# Patient Record
Sex: FEMALE | Race: BLACK OR AFRICAN AMERICAN | URBAN - METROPOLITAN AREA
[De-identification: names, ages, dates, MRNs, and addresses within clinical notes are randomized per-mention and may not be internally consistent; named-entity substitution may affect disease eponyms.]

---

## 2019-09-16 ENCOUNTER — APPOINTMENT (RX ONLY)
Dept: URBAN - METROPOLITAN AREA CLINIC 45 | Facility: CLINIC | Age: 69
Setting detail: DERMATOLOGY
End: 2019-09-16

## 2019-09-16 DIAGNOSIS — D22 MELANOCYTIC NEVI: ICD-10-CM

## 2019-09-16 DIAGNOSIS — D17 BENIGN LIPOMATOUS NEOPLASM: ICD-10-CM

## 2019-09-16 DIAGNOSIS — D18.0 HEMANGIOMA: ICD-10-CM

## 2019-09-16 DIAGNOSIS — L81.4 OTHER MELANIN HYPERPIGMENTATION: ICD-10-CM

## 2019-09-16 DIAGNOSIS — L82.1 OTHER SEBORRHEIC KERATOSIS: ICD-10-CM

## 2019-09-16 PROBLEM — D17.24 BENIGN LIPOMATOUS NEOPLASM OF SKIN AND SUBCUTANEOUS TISSUE OF LEFT LEG: Status: ACTIVE | Noted: 2019-09-16

## 2019-09-16 PROBLEM — D18.01 HEMANGIOMA OF SKIN AND SUBCUTANEOUS TISSUE: Status: ACTIVE | Noted: 2019-09-16

## 2019-09-16 PROBLEM — D17.0 BENIGN LIPOMATOUS NEOPLASM OF SKIN AND SUBCUTANEOUS TISSUE OF HEAD, FACE AND NECK: Status: ACTIVE | Noted: 2019-09-16

## 2019-09-16 PROBLEM — D22.5 MELANOCYTIC NEVI OF TRUNK: Status: ACTIVE | Noted: 2019-09-16

## 2019-09-16 PROCEDURE — 99213 OFFICE O/P EST LOW 20 MIN: CPT

## 2019-09-16 PROCEDURE — ? INVENTORY

## 2019-09-16 PROCEDURE — ? SUNSCREEN RECOMMENDATIONS

## 2019-09-16 PROCEDURE — ? OBSERVATION AND MEASURE

## 2019-09-16 PROCEDURE — ? COUNSELING

## 2019-09-16 PROCEDURE — ? DEFER

## 2019-09-16 ASSESSMENT — LOCATION SIMPLE DESCRIPTION DERM
LOCATION SIMPLE: RIGHT CHEEK
LOCATION SIMPLE: LEFT UPPER BACK
LOCATION SIMPLE: LEFT THIGH
LOCATION SIMPLE: ABDOMEN

## 2019-09-16 ASSESSMENT — LOCATION DETAILED DESCRIPTION DERM
LOCATION DETAILED: RIGHT LATERAL BUCCAL CHEEK
LOCATION DETAILED: LEFT ANTERIOR PROXIMAL THIGH
LOCATION DETAILED: LEFT MID-UPPER BACK
LOCATION DETAILED: RIGHT LATERAL ABDOMEN
LOCATION DETAILED: EPIGASTRIC SKIN

## 2019-09-16 ASSESSMENT — LOCATION ZONE DERM
LOCATION ZONE: LEG
LOCATION ZONE: FACE
LOCATION ZONE: TRUNK

## 2019-09-16 NOTE — HPI: EVALUATION OF SKIN LESION(S)
What Type Of Note Output Would You Prefer (Optional)?: Bullet Format
Hpi Title: Evaluation of Skin Lesions
How Severe Are Your Spot(S)?: mild
Have Your Spot(S) Been Treated In The Past?: has not been treated
Additional History: Last seen [facility] 08/2018. Patient has lipoma on right jawline- referred to plastics. Patient states a new lump is coming up on left hip/abdomen

## 2019-09-16 NOTE — PROCEDURE: MIPS QUALITY
Quality 46: Medication Reconciliation: For eligible patients only (patients seen within 30 days from discharge) Were discharged medications reconciled with the current medication list in their medication record within 30 days of discharge from the inpatient facility?
Quality 431: Preventive Care And Screening: Unhealthy Alcohol Use - Screening: Patient screened for unhealthy alcohol use using a single question and scores less than 2 times per year
Quality 110: Preventive Care And Screening: Influenza Immunization: Influenza Immunization previously received during influenza season
Quality 402: Tobacco Use And Help With Quitting Among Adolescents: Patient screened for tobacco and never smoked
Quality 130: Documentation Of Current Medications In The Medical Record: Current Medications Documented
Detail Level: Detailed
Quality 226: Preventive Care And Screening: Tobacco Use: Screening And Cessation Intervention: Patient screened for tobacco use and is an ex/non-smoker

## 2021-01-06 ENCOUNTER — WEB ENCOUNTER (OUTPATIENT)
Dept: URBAN - METROPOLITAN AREA CLINIC 54 | Facility: CLINIC | Age: 71
End: 2021-01-06

## 2021-01-06 ENCOUNTER — OFFICE VISIT (OUTPATIENT)
Dept: URBAN - METROPOLITAN AREA CLINIC 54 | Facility: CLINIC | Age: 71
End: 2021-01-06
Payer: MEDICARE

## 2021-01-06 DIAGNOSIS — K21.9 GASTROESOPHAGEAL REFLUX DISEASE WITHOUT ESOPHAGITIS: ICD-10-CM

## 2021-01-06 PROCEDURE — 99214 OFFICE O/P EST MOD 30 MIN: CPT | Performed by: INTERNAL MEDICINE

## 2021-01-06 PROCEDURE — G8420 CALC BMI NORM PARAMETERS: HCPCS | Performed by: INTERNAL MEDICINE

## 2021-01-06 PROCEDURE — G8427 DOCREV CUR MEDS BY ELIG CLIN: HCPCS | Performed by: INTERNAL MEDICINE

## 2021-01-06 PROCEDURE — 3017F COLORECTAL CA SCREEN DOC REV: CPT | Performed by: INTERNAL MEDICINE

## 2021-01-06 RX ORDER — FAMOTIDINE 20 MG/1
1 TABLET AT BEDTIME AS NEEDED TABLET, FILM COATED ORAL BID
Qty: 60 TABLET | Refills: 3 | OUTPATIENT
Start: 2021-01-06

## 2021-01-06 RX ORDER — OMEPRAZOLE 40 MG/1
TAKE 1 CAPSULE BY ORAL ROUTE DAILY CAPSULE, DELAYED RELEASE PELLETS ORAL 1
Qty: 30 | Refills: 5 | Status: DISCONTINUED | COMMUNITY
Start: 2018-10-10

## 2021-01-06 RX ORDER — PROMETHAZINE HYDROCHLORIDE 25 MG/1
TAKE 1 TABLET BY ORAL ROUTE EVERY 6 HOURS AS NEEDED TABLET ORAL
Qty: 40 | Refills: 3 | Status: DISCONTINUED | COMMUNITY
Start: 2018-03-07

## 2021-01-06 NOTE — HPI-TODAY'S VISIT:
70-year-old lady with a history of GERD proctalgia food Ki internal hemorrhoids presents here for follow-up. Patient reports that her GERD and indigestion has been ongoing since 2017.  Occurs once per week.  She denies any nausea vomiting.  Had occurs anytime nighttime or daytime.  She describes it as occurring 3 to 4 hours postprandial.  She denies any dysphagia or weight loss.  She denies any NSAID use.  She denies any tobacco alcohol or drug use.  She denies any family history of colon cancer. Prior GI work-up included colonoscopy in 2017 in which she had a 2mm tubular adenomatous polyp and internal hemorrhoids.  Upper endoscopy in 2017 showed gastric erythema biopsies were negative for H. pylori and EOE.

## 2021-03-01 ENCOUNTER — OFFICE VISIT (OUTPATIENT)
Dept: URBAN - METROPOLITAN AREA TELEHEALTH 2 | Facility: TELEHEALTH | Age: 71
End: 2021-03-01
Payer: MEDICARE

## 2021-03-01 DIAGNOSIS — R68.81 EARLY SATIETY: ICD-10-CM

## 2021-03-01 DIAGNOSIS — K92.0 HEMATEMESIS WITH NAUSEA: ICD-10-CM

## 2021-03-01 DIAGNOSIS — R11.0 NAUSEA: ICD-10-CM

## 2021-03-01 PROCEDURE — 99213 OFFICE O/P EST LOW 20 MIN: CPT | Performed by: INTERNAL MEDICINE

## 2021-03-01 RX ORDER — FAMOTIDINE 20 MG/1
1 TABLET AT BEDTIME AS NEEDED TABLET, FILM COATED ORAL BID
Qty: 60 TABLET | Refills: 3 | Status: DISCONTINUED | COMMUNITY
Start: 2021-01-06

## 2021-03-01 NOTE — HPI-TODAY'S VISIT:
Patient last seen by me in 2019.  She was seen in the [place] office in 1/2021.  She has been having nausea.  A few weeks ago she had epistaxis and she was seen by ENT.  She was "spitting some blood."  Her ENT thought that is related to her epistaxis.  She has been spitting some blood even when she does not have the nosebleeds.  She has been having nausea for the past week.  She cannot identify anything the nausea.  The nausea has been intermittent but can last the entire day.  She denies vomiting.  She denies anorexia or weight loss.  She denies abdominal pain.  She has occasional GERD.  She sometimes feels that the food is sitting in her chest.  This usually  happens in the m iddle of the night. She has to sit up to relieve this symptoms.  She denies dysphagia.  She denies LGI symptoms or bleed.  She denies any new medications.,  She has early satiety.  She is not on naroctics.  She does not have DM.  She did not take the pepcid.  Her PCP is Dr. Reny Whitehead.  Per the patient she had normal recent labs.

## 2021-09-28 ENCOUNTER — APPOINTMENT (RX ONLY)
Dept: URBAN - METROPOLITAN AREA CLINIC 45 | Facility: CLINIC | Age: 71
Setting detail: DERMATOLOGY
End: 2021-09-28

## 2021-09-28 DIAGNOSIS — D17 BENIGN LIPOMATOUS NEOPLASM: ICD-10-CM

## 2021-09-28 DIAGNOSIS — L72.8 OTHER FOLLICULAR CYSTS OF THE SKIN AND SUBCUTANEOUS TISSUE: ICD-10-CM | Status: STABLE

## 2021-09-28 DIAGNOSIS — R20.2 PARESTHESIA OF SKIN: ICD-10-CM

## 2021-09-28 PROBLEM — D17.0 BENIGN LIPOMATOUS NEOPLASM OF SKIN AND SUBCUTANEOUS TISSUE OF HEAD, FACE AND NECK: Status: ACTIVE | Noted: 2021-09-28

## 2021-09-28 PROBLEM — D17.24 BENIGN LIPOMATOUS NEOPLASM OF SKIN AND SUBCUTANEOUS TISSUE OF LEFT LEG: Status: ACTIVE | Noted: 2021-09-28

## 2021-09-28 PROCEDURE — 99213 OFFICE O/P EST LOW 20 MIN: CPT

## 2021-09-28 PROCEDURE — ? CONSULTATION EXCISION

## 2021-09-28 PROCEDURE — ? ADDITIONAL NOTES

## 2021-09-28 PROCEDURE — ? RECOMMENDATIONS

## 2021-09-28 PROCEDURE — ? OBSERVATION AND MEASURE

## 2021-09-28 PROCEDURE — ? COUNSELING

## 2021-09-28 ASSESSMENT — LOCATION SIMPLE DESCRIPTION DERM
LOCATION SIMPLE: RIGHT CHEEK
LOCATION SIMPLE: LEFT THIGH
LOCATION SIMPLE: ABDOMEN

## 2021-09-28 ASSESSMENT — LOCATION DETAILED DESCRIPTION DERM
LOCATION DETAILED: RIGHT LATERAL BUCCAL CHEEK
LOCATION DETAILED: LEFT ANTERIOR PROXIMAL THIGH
LOCATION DETAILED: PERIUMBILICAL SKIN

## 2021-09-28 ASSESSMENT — LOCATION ZONE DERM
LOCATION ZONE: TRUNK
LOCATION ZONE: LEG
LOCATION ZONE: FACE

## 2021-09-28 NOTE — PROCEDURE: ADDITIONAL NOTES
Additional Notes: Patient consent was obtained to proceed with the visit and recommended plan of care after discussion of all risks and benefits, including the risks of COVID-19 exposure.
Detail Level: Simple
Render Risk Assessment In Note?: no
Additional Notes:  measured patients lipoma on her right lateral buccal cheek and left anterior proximal thigh and the one on her cheek has grown about 1CM since  measured it in 2019 , but the lipoma on her left anterior proximal thigh has not .

## 2021-09-28 NOTE — PROCEDURE: RECOMMENDATIONS
Render Risk Assessment In Note?: no
Detail Level: Zone
Recommendation Preamble: The following recommendations were made during the visit:\\Reyna recommended seeing  for removal of lipoma

## 2021-09-28 NOTE — PROCEDURE: OBSERVATION
Detail Level: Detailed
Size Of Lesion: 4CM
Instructions (Optional): Recommended patient see Dr. Harman plastic surgeon for removal.
Size Of Lesion: 2CM

## 2022-03-28 ENCOUNTER — TELEPHONE ENCOUNTER (OUTPATIENT)
Dept: URBAN - METROPOLITAN AREA CLINIC 25 | Facility: CLINIC | Age: 72
End: 2022-03-28

## 2022-03-30 ENCOUNTER — OFFICE VISIT (OUTPATIENT)
Dept: URBAN - METROPOLITAN AREA CLINIC 25 | Facility: CLINIC | Age: 72
End: 2022-03-30

## 2022-04-18 PROBLEM — 428283002 HISTORY OF POLYP OF COLON: Status: ACTIVE | Noted: 2022-04-18

## 2022-05-02 ENCOUNTER — CLAIMS CREATED FROM THE CLAIM WINDOW (OUTPATIENT)
Dept: URBAN - METROPOLITAN AREA CLINIC 4 | Facility: CLINIC | Age: 72
End: 2022-05-02
Payer: MEDICARE

## 2022-05-02 ENCOUNTER — TELEPHONE ENCOUNTER (OUTPATIENT)
Dept: URBAN - METROPOLITAN AREA CLINIC 92 | Facility: CLINIC | Age: 72
End: 2022-05-02

## 2022-05-02 ENCOUNTER — OFFICE VISIT (OUTPATIENT)
Dept: URBAN - METROPOLITAN AREA SURGERY CENTER 20 | Facility: SURGERY CENTER | Age: 72
End: 2022-05-02
Payer: MEDICARE

## 2022-05-02 DIAGNOSIS — D12.3 ADENOMA OF TRANSVERSE COLON: ICD-10-CM

## 2022-05-02 DIAGNOSIS — Z86.010 ADENOMAS PERSONAL HISTORY OF COLONIC POLYPS: ICD-10-CM

## 2022-05-02 DIAGNOSIS — D12.3 BENIGN NEOPLASM OF TRANSVERSE COLON: ICD-10-CM

## 2022-05-02 PROBLEM — 235595009 GASTROESOPHAGEAL REFLUX DISEASE: Status: ACTIVE | Noted: 2022-05-02

## 2022-05-02 PROCEDURE — 45385 COLONOSCOPY W/LESION REMOVAL: CPT | Performed by: INTERNAL MEDICINE

## 2022-05-02 PROCEDURE — 88305 TISSUE EXAM BY PATHOLOGIST: CPT | Performed by: PATHOLOGY

## 2022-05-02 PROCEDURE — G8907 PT DOC NO EVENTS ON DISCHARG: HCPCS | Performed by: INTERNAL MEDICINE

## 2022-05-03 ENCOUNTER — OFFICE VISIT (OUTPATIENT)
Dept: URBAN - METROPOLITAN AREA CLINIC 25 | Facility: CLINIC | Age: 72
End: 2022-05-03

## 2022-05-25 ENCOUNTER — CLAIMS CREATED FROM THE CLAIM WINDOW (OUTPATIENT)
Dept: URBAN - METROPOLITAN AREA CLINIC 4 | Facility: CLINIC | Age: 72
End: 2022-05-25
Payer: MEDICARE

## 2022-05-25 ENCOUNTER — TELEPHONE ENCOUNTER (OUTPATIENT)
Dept: URBAN - METROPOLITAN AREA CLINIC 92 | Facility: CLINIC | Age: 72
End: 2022-05-25

## 2022-05-25 ENCOUNTER — OFFICE VISIT (OUTPATIENT)
Dept: URBAN - METROPOLITAN AREA SURGERY CENTER 20 | Facility: SURGERY CENTER | Age: 72
End: 2022-05-25
Payer: MEDICARE

## 2022-05-25 ENCOUNTER — WEB ENCOUNTER (OUTPATIENT)
Dept: URBAN - METROPOLITAN AREA SURGERY CENTER 20 | Facility: SURGERY CENTER | Age: 72
End: 2022-05-25

## 2022-05-25 DIAGNOSIS — K31.89 ACQUIRED DEFORMITY OF DUODENUM: ICD-10-CM

## 2022-05-25 DIAGNOSIS — R13.19 CERVICAL DYSPHAGIA: ICD-10-CM

## 2022-05-25 DIAGNOSIS — K31.89 FOCAL FOVEOLAR HYPERPLASIA: ICD-10-CM

## 2022-05-25 PROCEDURE — 43239 EGD BIOPSY SINGLE/MULTIPLE: CPT | Performed by: INTERNAL MEDICINE

## 2022-05-25 PROCEDURE — 88312 SPECIAL STAINS GROUP 1: CPT | Performed by: PATHOLOGY

## 2022-05-25 PROCEDURE — G8907 PT DOC NO EVENTS ON DISCHARG: HCPCS | Performed by: INTERNAL MEDICINE

## 2022-05-25 PROCEDURE — 88305 TISSUE EXAM BY PATHOLOGIST: CPT | Performed by: PATHOLOGY

## 2022-05-25 RX ORDER — OMEPRAZOLE 40 MG/1
1 CAPSULE 30 MINUTES BEFORE MORNING MEAL CAPSULE, DELAYED RELEASE ORAL ONCE A DAY
Qty: 30 | Refills: 5 | OUTPATIENT
Start: 2022-05-25

## 2022-10-06 ENCOUNTER — OFFICE VISIT (OUTPATIENT)
Dept: URBAN - METROPOLITAN AREA CLINIC 84 | Facility: CLINIC | Age: 72
End: 2022-10-06

## 2022-11-03 ENCOUNTER — OFFICE VISIT (OUTPATIENT)
Dept: URBAN - METROPOLITAN AREA CLINIC 84 | Facility: CLINIC | Age: 72
End: 2022-11-03
Payer: MEDICARE

## 2022-11-03 VITALS
DIASTOLIC BLOOD PRESSURE: 76 MMHG | HEART RATE: 54 BPM | HEIGHT: 63 IN | WEIGHT: 126.6 LBS | BODY MASS INDEX: 22.43 KG/M2 | SYSTOLIC BLOOD PRESSURE: 155 MMHG | TEMPERATURE: 97.8 F

## 2022-11-03 DIAGNOSIS — R13.14 PHARYNGOESOPHAGEAL DYSPHAGIA: ICD-10-CM

## 2022-11-03 DIAGNOSIS — K21.9 GASTROESOPHAGEAL REFLUX DISEASE WITHOUT ESOPHAGITIS: ICD-10-CM

## 2022-11-03 PROCEDURE — 99213 OFFICE O/P EST LOW 20 MIN: CPT | Performed by: INTERNAL MEDICINE

## 2022-11-03 RX ORDER — OMEPRAZOLE 40 MG/1
1 CAPSULE 30 MINUTES BEFORE MORNING MEAL CAPSULE, DELAYED RELEASE ORAL ONCE A DAY
Qty: 30 | Refills: 5 | Status: ACTIVE | COMMUNITY
Start: 2022-05-25

## 2022-11-03 NOTE — HPI-TODAY'S VISIT:
EGD was normal.  Colonoscopy had a small TA.  She is taking the Omperazole.  She no longer has the dysphagia.  She feels like her mouth is "sour."  This started about 3 weeks ago. She does not know what may have triggered this.  She denies anroexia or weight loss.  She denies abdominal pain.  She denies GERD/N/V.  She denies LGI symptoms.  She denies LGI bleed or melena.  She recently had her 6 month dental follow up

## 2022-11-14 PROBLEM — 266435005: Status: ACTIVE | Noted: 2021-01-06

## 2022-11-14 PROBLEM — 40739000 DYSPHAGIA: Status: ACTIVE | Noted: 2022-05-02

## 2023-06-13 ENCOUNTER — APPOINTMENT (RX ONLY)
Dept: URBAN - METROPOLITAN AREA CLINIC 45 | Facility: CLINIC | Age: 73
Setting detail: DERMATOLOGY
End: 2023-06-13

## 2023-06-13 DIAGNOSIS — D17 BENIGN LIPOMATOUS NEOPLASM: ICD-10-CM

## 2023-06-13 DIAGNOSIS — L72.0 EPIDERMAL CYST: ICD-10-CM

## 2023-06-13 DIAGNOSIS — L82.1 OTHER SEBORRHEIC KERATOSIS: ICD-10-CM

## 2023-06-13 PROBLEM — D17.24 BENIGN LIPOMATOUS NEOPLASM OF SKIN AND SUBCUTANEOUS TISSUE OF LEFT LEG: Status: ACTIVE | Noted: 2023-06-13

## 2023-06-13 PROBLEM — D17.0 BENIGN LIPOMATOUS NEOPLASM OF SKIN AND SUBCUTANEOUS TISSUE OF HEAD, FACE AND NECK: Status: ACTIVE | Noted: 2023-06-13

## 2023-06-13 PROCEDURE — ? COUNSELING

## 2023-06-13 PROCEDURE — ? OBSERVATION AND MEASURE

## 2023-06-13 PROCEDURE — ? ADDITIONAL NOTES

## 2023-06-13 PROCEDURE — 99213 OFFICE O/P EST LOW 20 MIN: CPT

## 2023-06-13 PROCEDURE — ? FULL BODY SKIN EXAM - DECLINED

## 2023-06-13 ASSESSMENT — LOCATION SIMPLE DESCRIPTION DERM
LOCATION SIMPLE: LEFT THIGH
LOCATION SIMPLE: RIGHT BREAST
LOCATION SIMPLE: RIGHT CHEEK
LOCATION SIMPLE: LEFT EYEBROW

## 2023-06-13 ASSESSMENT — LOCATION ZONE DERM
LOCATION ZONE: TRUNK
LOCATION ZONE: FACE
LOCATION ZONE: LEG

## 2023-06-13 ASSESSMENT — LOCATION DETAILED DESCRIPTION DERM
LOCATION DETAILED: RIGHT LATERAL BREAST 8-9:00 REGION
LOCATION DETAILED: RIGHT LATERAL BREAST 7-8:00 REGION
LOCATION DETAILED: LEFT ANTERIOR PROXIMAL THIGH
LOCATION DETAILED: RIGHT LATERAL BUCCAL CHEEK
LOCATION DETAILED: LEFT LATERAL EYEBROW

## 2023-06-13 NOTE — PROCEDURE: OBSERVATION
Detail Level: Detailed
Size Of Lesion: 3CM
Instructions (Optional): Recommended patient see Dr. Harman plastic surgeon for removal.
Size Of Lesion: 2.5 cm

## 2023-06-13 NOTE — HPI: SKIN LESION
What Type Of Note Output Would You Prefer (Optional)?: Bullet Format
How Severe Is Your Skin Lesion?: mild
Has Your Skin Lesion Been Treated?: not been treated
Is This A New Presentation, Or A Follow-Up?: Skin Lesions
Additional History: Pt presents for a skin lesions that she would like evaluated. Pt reports she has various blackheads appearing scattering from her face to right breast to the lower back.

## 2023-06-13 NOTE — PROCEDURE: ADDITIONAL NOTES
Render Risk Assessment In Note?: no
Detail Level: Simple
Additional Notes: Pt was quoted $25 per each milia with [facility].

## 2023-08-01 ENCOUNTER — OFFICE VISIT (OUTPATIENT)
Dept: URBAN - METROPOLITAN AREA CLINIC 25 | Facility: CLINIC | Age: 73
End: 2023-08-01

## 2023-08-03 ENCOUNTER — TELEPHONE ENCOUNTER (OUTPATIENT)
Dept: URBAN - METROPOLITAN AREA CLINIC 84 | Facility: CLINIC | Age: 73
End: 2023-08-03

## 2023-08-03 ENCOUNTER — OFFICE VISIT (OUTPATIENT)
Dept: URBAN - METROPOLITAN AREA CLINIC 84 | Facility: CLINIC | Age: 73
End: 2023-08-03
Payer: MEDICARE

## 2023-08-03 VITALS
BODY MASS INDEX: 22.86 KG/M2 | WEIGHT: 129 LBS | DIASTOLIC BLOOD PRESSURE: 76 MMHG | TEMPERATURE: 96.8 F | SYSTOLIC BLOOD PRESSURE: 146 MMHG | HEART RATE: 52 BPM | HEIGHT: 63 IN

## 2023-08-03 DIAGNOSIS — R10.13 DYSPEPSIA: ICD-10-CM

## 2023-08-03 DIAGNOSIS — R10.30 LOWER ABDOMINAL PAIN: ICD-10-CM

## 2023-08-03 DIAGNOSIS — R10.2 PELVIC PAIN: ICD-10-CM

## 2023-08-03 DIAGNOSIS — K21.9 GERD: ICD-10-CM

## 2023-08-03 PROCEDURE — 99213 OFFICE O/P EST LOW 20 MIN: CPT | Performed by: INTERNAL MEDICINE

## 2023-08-03 RX ORDER — OMEPRAZOLE 40 MG/1
1 CAPSULE 30 MINUTES BEFORE MORNING MEAL CAPSULE, DELAYED RELEASE ORAL ONCE A DAY
Qty: 30 | Refills: 5 | Status: ON HOLD | COMMUNITY
Start: 2022-05-25

## 2023-08-03 NOTE — HPI-TODAY'S VISIT:
Patient last seen 11/2022.  She is no longer on the Omeprazole.  She denies GERD/N/V/dysphagia.  She still has the sour taste in her mouth.  This has not changed since stopping the PPI.  She has not discussed this symptoms with her PCP.  She denies anroexia or weight loss.  SHe has a lower abdominal pain for a few months.  There is bloat.  SHe has frequent urination.  She was seen by  and she was told that everything was fine.  She denies LGI symptoms.  The pain is not related to her BM's.  She had some blood streak on her stool last month.  This only happended once.  She denies melena.  She has not been seen by GYN sonce the pain started.  Per the patient she had a CT Scan with contrast about a month ago that was normal

## 2023-08-03 NOTE — PHYSICAL EXAM GASTROINTESTINAL
Abdomen , soft, nontender, nondistended , no guarding or rigidity , no masses palpable , normal bowel sounds , Liver and Spleen,  no hepatosplenomegaly , liver nontender.  Lower abdomen/pelvic tenderness

## 2023-09-11 ENCOUNTER — TELEPHONE ENCOUNTER (OUTPATIENT)
Dept: URBAN - METROPOLITAN AREA CLINIC 60 | Facility: CLINIC | Age: 73
End: 2023-09-11

## 2023-09-14 ENCOUNTER — TELEPHONE ENCOUNTER (OUTPATIENT)
Dept: URBAN - METROPOLITAN AREA CLINIC 84 | Facility: CLINIC | Age: 73
End: 2023-09-14

## 2024-02-15 ENCOUNTER — OV EP (OUTPATIENT)
Dept: URBAN - METROPOLITAN AREA CLINIC 84 | Facility: CLINIC | Age: 74
End: 2024-02-15
Payer: MEDICARE

## 2024-02-15 VITALS
DIASTOLIC BLOOD PRESSURE: 68 MMHG | HEART RATE: 57 BPM | HEIGHT: 63 IN | BODY MASS INDEX: 22.5 KG/M2 | SYSTOLIC BLOOD PRESSURE: 146 MMHG | WEIGHT: 127 LBS | TEMPERATURE: 97.5 F

## 2024-02-15 DIAGNOSIS — R10.13 DYSPEPSIA: ICD-10-CM

## 2024-02-15 DIAGNOSIS — R10.30 LOWER ABDOMINAL PAIN: ICD-10-CM

## 2024-02-15 DIAGNOSIS — K21.9 GERD: ICD-10-CM

## 2024-02-15 DIAGNOSIS — K58.8 OTHER IRRITABLE BOWEL SYNDROME: ICD-10-CM

## 2024-02-15 PROCEDURE — 99214 OFFICE O/P EST MOD 30 MIN: CPT | Performed by: INTERNAL MEDICINE

## 2024-02-15 RX ORDER — OMEPRAZOLE 40 MG/1
1 CAPSULE 30 MINUTES BEFORE MORNING MEAL CAPSULE, DELAYED RELEASE ORAL ONCE A DAY
Qty: 30 | Refills: 5 | COMMUNITY
Start: 2022-05-25

## 2024-02-15 RX ORDER — DICYCLOMINE HYDROCHLORIDE 20 MG/1
1- 2 TABLETS TABLET ORAL
Qty: 60 | Refills: 2 | OUTPATIENT
Start: 2024-02-15 | End: 2024-05-15

## 2024-02-15 NOTE — HPI-TODAY'S VISIT:
Patient seen 8/2023.  She has a lot of bloat and pelvic pain with associated LBP.  She has a rectal pressure.  She has a daily BM but she has pellet stool with strain and poor emptying.  The pelvic pain does not change with a BM.  SHe denies LGI Bleed or melena.  She was recently seen by GYN a few days ago.  SHe was found to have "liquid in her cervix" and a UTI.  SHe is on Abx now.  They sent the fluid for cytology.  She has frequent urination.  She was seen by  last year and told that everything was fine. She denies anorexia or weight loss.  She denies UGI symptoms.  She has occaisonal chest pressure when she eats with associated reflux. She is not on PPI therapy.

## 2024-07-25 ENCOUNTER — DASHBOARD ENCOUNTERS (OUTPATIENT)
Age: 74
End: 2024-07-25

## 2024-08-01 ENCOUNTER — OFFICE VISIT (OUTPATIENT)
Dept: URBAN - METROPOLITAN AREA CLINIC 84 | Facility: CLINIC | Age: 74
End: 2024-08-01

## 2024-08-01 RX ORDER — OMEPRAZOLE 40 MG/1
1 CAPSULE 30 MINUTES BEFORE MORNING MEAL CAPSULE, DELAYED RELEASE ORAL ONCE A DAY
Qty: 30 | Refills: 5 | COMMUNITY
Start: 2022-05-25

## 2024-09-18 ENCOUNTER — TELEPHONE ENCOUNTER (OUTPATIENT)
Dept: URBAN - METROPOLITAN AREA CLINIC 84 | Facility: CLINIC | Age: 74
End: 2024-09-18

## 2024-09-18 ENCOUNTER — OFFICE VISIT (OUTPATIENT)
Dept: URBAN - METROPOLITAN AREA TELEHEALTH 2 | Facility: TELEHEALTH | Age: 74
End: 2024-09-18
Payer: MEDICARE

## 2024-09-18 VITALS — HEIGHT: 63 IN | WEIGHT: 125 LBS | BODY MASS INDEX: 22.15 KG/M2

## 2024-09-18 DIAGNOSIS — K58.8 OTHER IRRITABLE BOWEL SYNDROME: ICD-10-CM

## 2024-09-18 DIAGNOSIS — K21.9 GERD: ICD-10-CM

## 2024-09-18 DIAGNOSIS — R10.2 PELVIC PAIN: ICD-10-CM

## 2024-09-18 DIAGNOSIS — R10.84 ABDOMINAL CRAMPING, GENERALIZED: ICD-10-CM

## 2024-09-18 PROCEDURE — 99214 OFFICE O/P EST MOD 30 MIN: CPT | Performed by: INTERNAL MEDICINE

## 2024-09-18 RX ORDER — CEPHALEXIN 250 MG/1
1 CAPSULE CAPSULE ORAL ONCE A DAY
Status: ACTIVE | COMMUNITY

## 2024-09-18 RX ORDER — ASCORBIC ACID 1000 MG
1 CAPSULE TABLET ORAL ONCE A DAY
Status: ACTIVE | COMMUNITY

## 2024-09-18 RX ORDER — FAMOTIDINE 40 MG/1
1 TABLET TABLET, FILM COATED ORAL
Refills: 5 | OUTPATIENT
Start: 2024-09-18

## 2024-09-18 RX ORDER — OMEPRAZOLE 40 MG/1
1 CAPSULE 30 MINUTES BEFORE MORNING MEAL CAPSULE, DELAYED RELEASE ORAL ONCE A DAY
Qty: 30 | Refills: 5 | COMMUNITY
Start: 2022-05-25

## 2024-09-18 NOTE — HPI-TODAY'S VISIT:
Patient seen 6 months ago.  Min went to the ED in August for syncope. Hospital records were reviewed in clinic today including Attending notes, imaging reports, BMP/CBC/LFT's.  She had a negative CT of the head.  She had a UTI that was treated.  She is following up with Cardiology.  She uses MIralax PRN.  She has a BM on a fairly regular basis.  She still has pellet stools with poor emptying.  She is having some abdominal pain.  She does not think that the Bentyl was helpful.  She denies anorexia or weight loss.  She denies GERD but she has a bitter taste in her mouth.  She denies N/V.  She is following up with GYN and .  She is not on antacids.  She is concerned about her pancreas

## 2024-09-19 ENCOUNTER — OFFICE VISIT (OUTPATIENT)
Dept: URBAN - METROPOLITAN AREA CLINIC 84 | Facility: CLINIC | Age: 74
End: 2024-09-19
Payer: MEDICARE

## 2024-09-19 VITALS
BODY MASS INDEX: 21.97 KG/M2 | WEIGHT: 124 LBS | TEMPERATURE: 97.2 F | DIASTOLIC BLOOD PRESSURE: 85 MMHG | SYSTOLIC BLOOD PRESSURE: 141 MMHG | HEIGHT: 63 IN | HEART RATE: 58 BPM

## 2024-09-19 DIAGNOSIS — R10.2 PELVIC PAIN: ICD-10-CM

## 2024-09-19 DIAGNOSIS — R10.30 LOWER ABDOMINAL PAIN: ICD-10-CM

## 2024-09-19 DIAGNOSIS — K21.9 GERD: ICD-10-CM

## 2024-09-19 DIAGNOSIS — K58.1 IRRITABLE BOWEL SYNDROME WITH CONSTIPATION: ICD-10-CM

## 2024-09-19 PROCEDURE — 99214 OFFICE O/P EST MOD 30 MIN: CPT | Performed by: INTERNAL MEDICINE

## 2024-09-19 RX ORDER — FAMOTIDINE 40 MG/1
1 TABLET TABLET, FILM COATED ORAL
Refills: 5 | OUTPATIENT
Start: 2024-09-19

## 2024-09-19 NOTE — HPI-TODAY'S VISIT:
Patient seen in Telemed appt yesterday.  We discussed her recent hospitalization and her GI symptoms.  She has ahd recurrent constipation for the past 1-2 weeks.  She denies LGI Bleed or melena.  She passed out on 8/24.  She denies anroexia or weight loss.  SHe has had a bitter taste in her mouth.  It is mild but present. She denies GERD/N/V.  SHe is following  for overactive bladder.  She is scheduled for testing next week.  She continues to have pelvic pain

## 2024-09-20 ENCOUNTER — LAB OUTSIDE AN ENCOUNTER (OUTPATIENT)
Dept: URBAN - METROPOLITAN AREA CLINIC 84 | Facility: CLINIC | Age: 74
End: 2024-09-20

## 2024-09-23 LAB
CA 19-9: 22
LIPASE: 11

## 2024-09-24 LAB — PANCREATICELASTASE ELISA, STOOL: (no result)

## 2024-09-30 ENCOUNTER — TELEPHONE ENCOUNTER (OUTPATIENT)
Dept: URBAN - METROPOLITAN AREA CLINIC 84 | Facility: CLINIC | Age: 74
End: 2024-09-30